# Patient Record
Sex: MALE | URBAN - METROPOLITAN AREA
[De-identification: names, ages, dates, MRNs, and addresses within clinical notes are randomized per-mention and may not be internally consistent; named-entity substitution may affect disease eponyms.]

---

## 2021-10-14 ENCOUNTER — APPOINTMENT (RX ONLY)
Dept: URBAN - METROPOLITAN AREA CLINIC 124 | Facility: CLINIC | Age: 66
Setting detail: DERMATOLOGY
End: 2021-10-14

## 2021-10-14 DIAGNOSIS — L30.9 DERMATITIS, UNSPECIFIED: ICD-10-CM | Status: INADEQUATELY CONTROLLED

## 2021-10-14 PROCEDURE — 99203 OFFICE O/P NEW LOW 30 MIN: CPT

## 2021-10-14 PROCEDURE — ? COUNSELING

## 2021-10-14 PROCEDURE — ? ADDITIONAL NOTES

## 2021-10-14 PROCEDURE — ? PRESCRIPTION

## 2021-10-14 RX ORDER — ALCLOMETASONE DIPROPIONATE 0.5 MG/G
CREAM TOPICAL
Qty: 60 | Refills: 0 | Status: ERX | COMMUNITY
Start: 2021-10-14

## 2021-10-14 RX ADMIN — ALCLOMETASONE DIPROPIONATE: 0.5 CREAM TOPICAL at 00:00

## 2021-10-14 ASSESSMENT — LOCATION SIMPLE DESCRIPTION DERM: LOCATION SIMPLE: PENIS

## 2021-10-14 ASSESSMENT — LOCATION DETAILED DESCRIPTION DERM: LOCATION DETAILED: DORSAL GLANS

## 2021-10-14 ASSESSMENT — LOCATION ZONE DERM: LOCATION ZONE: PENIS

## 2021-10-14 NOTE — PROCEDURE: ADDITIONAL NOTES
Additional Notes: Patient consent was obtained to proceed with the visit and recommended plan of care after discussion of all risks and benefits, including the risks of COVID-19 exposure.
Render Risk Assessment In Note?: yes
Detail Level: Simple
Additional Notes: Denies pain, tenderness, itching. Denies urinary symptoms. Lesion does not reflect HPV, Herpes. Flat reddish lesion on glans of penis. Patient states he has recently switched to Gain detergent. I have advised he switch to All free and clear and follow up in 1 month.
Render Risk Assessment In Note?: no

## 2024-11-15 ENCOUNTER — APPOINTMENT (RX ONLY)
Dept: URBAN - METROPOLITAN AREA CLINIC 169 | Facility: CLINIC | Age: 69
Setting detail: DERMATOLOGY
End: 2024-11-15
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
## 2024-11-15 DIAGNOSIS — L57.0 ACTINIC KERATOSIS: ICD-10-CM

## 2024-11-15 DIAGNOSIS — L85.3 XEROSIS CUTIS: ICD-10-CM

## 2024-11-15 DIAGNOSIS — D49.2 NEOPLASM OF UNSPECIFIED BEHAVIOR OF BONE, SOFT TISSUE, AND SKIN: ICD-10-CM

## 2024-11-15 DIAGNOSIS — L08.9 LOCAL INFECTION OF THE SKIN AND SUBCUTANEOUS TISSUE, UNSPECIFIED: ICD-10-CM
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
## 2024-11-15 PROCEDURE — ? PRESCRIPTION

## 2024-11-15 PROCEDURE — 99203 OFFICE O/P NEW LOW 30 MIN: CPT | Mod: 25

## 2024-11-15 PROCEDURE — ? PHOTO-DOCUMENTATION

## 2024-11-15 PROCEDURE — ? ADDITIONAL NOTES

## 2024-11-15 PROCEDURE — 17000 DESTRUCT PREMALG LESION: CPT | Mod: 59

## 2024-11-15 PROCEDURE — 17003 DESTRUCT PREMALG LES 2-14: CPT

## 2024-11-15 PROCEDURE — ? LIQUID NITROGEN

## 2024-11-15 PROCEDURE — 11102 TANGNTL BX SKIN SINGLE LES: CPT

## 2024-11-15 PROCEDURE — ? COUNSELING

## 2024-11-15 PROCEDURE — ? OTC TREATMENT REGIMEN

## 2024-11-15 PROCEDURE — ? PRESCRIPTION MEDICATION MANAGEMENT

## 2024-11-15 PROCEDURE — ? BIOPSY BY SHAVE METHOD

## 2024-11-15 RX ORDER — MUPIROCIN 20 MG/G
OINTMENT TOPICAL BID
Qty: 22 | Refills: 1 | Status: CANCELLED | COMMUNITY
Start: 2024-11-15

## 2024-11-15 RX ORDER — MUPIROCIN 20 MG/G
OINTMENT TOPICAL BID
Qty: 22 | Refills: 1 | Status: ERX

## 2024-11-15 RX ADMIN — MUPIROCIN: 20 OINTMENT TOPICAL at 00:00

## 2024-11-15 ASSESSMENT — LOCATION SIMPLE DESCRIPTION DERM
LOCATION SIMPLE: LEFT CHEEK
LOCATION SIMPLE: RIGHT LIP
LOCATION SIMPLE: LEFT LIP
LOCATION SIMPLE: LEFT TEMPLE
LOCATION SIMPLE: LEFT LOWER BACK

## 2024-11-15 ASSESSMENT — LOCATION ZONE DERM
LOCATION ZONE: FACE
LOCATION ZONE: TRUNK
LOCATION ZONE: LIP

## 2024-11-15 ASSESSMENT — LOCATION DETAILED DESCRIPTION DERM
LOCATION DETAILED: LEFT INFERIOR VERMILION LIP
LOCATION DETAILED: LEFT INFERIOR MEDIAL MIDBACK
LOCATION DETAILED: LEFT CENTRAL TEMPLE
LOCATION DETAILED: LEFT SUPERIOR LATERAL MALAR CHEEK
LOCATION DETAILED: RIGHT INFERIOR VERMILION LIP

## 2024-11-15 NOTE — PROCEDURE: ADDITIONAL NOTES
Additional Notes: The patient states he has laid in a tanning bed one time
Render Risk Assessment In Note?: no
Detail Level: Zone

## 2024-11-15 NOTE — PROCEDURE: OTC TREATMENT REGIMEN
Detail Level: Simple
Patient Specific Otc Recommendations (Will Not Stick From Patient To Patient): The patient states he has recently changed toothpaste. The patient was advised to apply a small amount of Vaseline or oil prior to brushing his teeth.
Samples Given: Dr. Valera

## 2024-11-15 NOTE — PROCEDURE: LIQUID NITROGEN
Duration Of Freeze Thaw-Cycle (Seconds): 2
Render Post-Care Instructions In Note?: no
Number Of Freeze-Thaw Cycles: 2 freeze-thaw cycles
Consent: The patient's consent was obtained including but not limited to risks of crusting, scabbing, blistering, scarring, darker or lighter pigmentary change, recurrence, incomplete removal and infection.
Show Aperture Variable?: Yes
Post-Care Instructions: I reviewed with the patient in detail post-care instructions. Patient is to wear sunprotection, and avoid picking at any of the treated lesions. Pt may apply Vaseline to crusted or scabbing areas.
Detail Level: Detailed

## 2024-11-15 NOTE — PROCEDURE: PRESCRIPTION MEDICATION MANAGEMENT
Initiate Treatment: Mupirocin ointment bid x 3 weeks to lip
Render In Strict Bullet Format?: No
Detail Level: Zone

## 2024-11-15 NOTE — HPI: DRY SKIN
How Severe Is Your Dry Skin?: mild
Is This A New Presentation Or A Follow-Up?: Dry Skin
Additional History: The patient’s PCP  recommended his dentist look at the area and was given a prescription for Nystatin ointment. The patient has been using nystatin bid and states this has been helping.

## 2024-11-15 NOTE — PROCEDURE: BIOPSY BY SHAVE METHOD
Detail Level: Detailed
Depth Of Biopsy: dermis
Was A Bandage Applied: Yes
Size Of Lesion In Cm: 0.4
X Size Of Lesion In Cm: 0
Biopsy Type: H and E
Biopsy Method: Dermablade
Anesthesia Type: 1% lidocaine with epinephrine
Anesthesia Volume In Cc: 0.5
Hemostasis: Drysol
Wound Care: Petrolatum
Dressing: bandage
Destruction After The Procedure: No
Type Of Destruction Used: Curettage
Curettage Text: The wound bed was treated with curettage after the biopsy was performed.
Cryotherapy Text: The wound bed was treated with cryotherapy after the biopsy was performed.
Electrodesiccation Text: The wound bed was treated with electrodesiccation after the biopsy was performed.
Electrodesiccation And Curettage Text: The wound bed was treated with electrodesiccation and curettage after the biopsy was performed.
Silver Nitrate Text: The wound bed was treated with silver nitrate after the biopsy was performed.
Lab: [id number]
Consent: Written consent was obtained and risks were reviewed including but not limited to scarring, infection, bleeding, scabbing, incomplete removal, nerve damage and allergy to anesthesia.
Post-Care Instructions: I reviewed with the patient in detail post-care instructions. Patient is to keep the biopsy site dry overnight, and then apply bacitracin twice daily until healed. Patient may apply hydrogen peroxide soaks to remove any crusting.
Notification Instructions: Patient will be notified of biopsy results. However, patient instructed to call the office if not contacted within 2 weeks.
Billing Type: Third-Party Bill
Information: Selecting Yes will display possible errors in your note based on the variables you have selected. This validation is only offered as a suggestion for you. PLEASE NOTE THAT THE VALIDATION TEXT WILL BE REMOVED WHEN YOU FINALIZE YOUR NOTE. IF YOU WANT TO FAX A PRELIMINARY NOTE YOU WILL NEED TO TOGGLE THIS TO 'NO' IF YOU DO NOT WANT IT IN YOUR FAXED NOTE.